# Patient Record
Sex: MALE | Race: WHITE | NOT HISPANIC OR LATINO | Employment: FULL TIME | URBAN - METROPOLITAN AREA
[De-identification: names, ages, dates, MRNs, and addresses within clinical notes are randomized per-mention and may not be internally consistent; named-entity substitution may affect disease eponyms.]

---

## 2018-09-12 ENCOUNTER — HOSPITAL ENCOUNTER (EMERGENCY)
Facility: HOSPITAL | Age: 33
Discharge: HOME | End: 2018-09-12
Attending: EMERGENCY MEDICINE

## 2018-09-12 VITALS
TEMPERATURE: 98 F | RESPIRATION RATE: 16 BRPM | DIASTOLIC BLOOD PRESSURE: 61 MMHG | HEART RATE: 73 BPM | OXYGEN SATURATION: 97 % | WEIGHT: 180 LBS | SYSTOLIC BLOOD PRESSURE: 128 MMHG

## 2018-09-12 DIAGNOSIS — T16.2XXA FOREIGN BODY OF LEFT EAR, INITIAL ENCOUNTER: ICD-10-CM

## 2018-09-12 DIAGNOSIS — J01.90 ACUTE SINUSITIS, RECURRENCE NOT SPECIFIED, UNSPECIFIED LOCATION: Primary | ICD-10-CM

## 2018-09-12 PROCEDURE — 99283 EMERGENCY DEPT VISIT LOW MDM: CPT | Mod: 25

## 2018-09-12 PROCEDURE — 96372 THER/PROPH/DIAG INJ SC/IM: CPT

## 2018-09-12 PROCEDURE — 63600175 PHARM REV CODE 636 W HCPCS: Performed by: NURSE PRACTITIONER

## 2018-09-12 RX ORDER — CETIRIZINE HYDROCHLORIDE 10 MG/1
10 TABLET ORAL DAILY PRN
Qty: 20 TABLET | Refills: 0 | Status: SHIPPED | OUTPATIENT
Start: 2018-09-12 | End: 2020-04-01 | Stop reason: SDUPTHER

## 2018-09-12 RX ORDER — DEXAMETHASONE SODIUM PHOSPHATE 4 MG/ML
12 INJECTION, SOLUTION INTRA-ARTICULAR; INTRALESIONAL; INTRAMUSCULAR; INTRAVENOUS; SOFT TISSUE
Status: COMPLETED | OUTPATIENT
Start: 2018-09-12 | End: 2018-09-12

## 2018-09-12 RX ORDER — ONDANSETRON 4 MG/1
4 TABLET, FILM COATED ORAL EVERY 8 HOURS PRN
Qty: 14 TABLET | Refills: 0 | Status: SHIPPED | OUTPATIENT
Start: 2018-09-12 | End: 2019-01-31

## 2018-09-12 RX ORDER — AMOXICILLIN 875 MG/1
875 TABLET, FILM COATED ORAL 2 TIMES DAILY
Qty: 20 TABLET | Refills: 0 | Status: SHIPPED | OUTPATIENT
Start: 2018-09-12 | End: 2018-09-22

## 2018-09-12 RX ADMIN — DEXAMETHASONE SODIUM PHOSPHATE 12 MG: 4 INJECTION, SOLUTION INTRAMUSCULAR; INTRAVENOUS at 06:09

## 2018-09-12 NOTE — ED PROVIDER NOTES
"Encounter Date: 9/12/2018    SCRIBE #1 NOTE: I, Mercedes Sahu, am scribing for, and in the presence of,  JOSE ANTONIO Leon. I have scribed the following portions of the note - Other sections scribed: HPI, ROS, PE.       History     Chief Complaint   Patient presents with    URI     Pt states," Congestion for last week, last night nausea and vomitng. today nausea and congestion."     A 33 year old male presents to the ED complaining of sinus congestion for one week. He notes postnasal drip, cough, and intermittent headache, but denies fever. He notes he has had these symptoms before but reports they always return. He reports nausea and vomiting x 2 today.  Denies a sore throat. He denies taking OTC meds.         The history is provided by the patient.   URI   The primary symptoms include headaches, cough, nausea and vomiting. Primary symptoms do not include fever or sore throat. Illness onset: one week. This is a recurrent problem.   The headache began more than 2 days ago. The headache is present intermittently. The pain from the headache is at a severity of 0/10. Location/region(s) of the headache: frontal. The headache is associated with activity.   The cough began 6 to 7 days ago. The cough is non-productive.   Nausea began today.   The vomiting began today.   Symptoms associated with the illness include sinus pressure and congestion.     Review of patient's allergies indicates:  No Known Allergies  History reviewed. No pertinent past medical history.  History reviewed. No pertinent surgical history.  History reviewed. No pertinent family history.  Social History     Tobacco Use    Smoking status: Current Every Day Smoker     Types: Cigarettes    Smokeless tobacco: Never Used   Substance Use Topics    Alcohol use: No     Frequency: Never    Drug use: Not on file     Review of Systems   Constitutional: Negative.  Negative for fever.   HENT: Positive for congestion, postnasal drip, sinus pressure and sinus " pain. Negative for sore throat.    Eyes: Negative.    Respiratory: Positive for cough.    Cardiovascular: Negative.    Gastrointestinal: Positive for nausea and vomiting.   Endocrine: Negative.    Genitourinary: Negative.    Musculoskeletal: Negative.    Skin: Negative.    Allergic/Immunologic: Negative.    Neurological: Positive for headaches.   Hematological: Negative.    Psychiatric/Behavioral: Negative.    All other systems reviewed and are negative.      Physical Exam     Initial Vitals [09/12/18 1819]   BP Pulse Resp Temp SpO2   128/61 73 16 98 °F (36.7 °C) 97 %      MAP       --         Physical Exam    Nursing note and vitals reviewed.  Constitutional: He appears well-developed and well-nourished.   HENT:   Head: Normocephalic and atraumatic.   Right Ear: Tympanic membrane normal.   Left Ear: Tympanic membrane normal. A foreign body (dead bug) is present.   Nose: Mucosal edema (congestion) present. Right sinus exhibits frontal sinus tenderness. Left sinus exhibits frontal sinus tenderness.   Mouth/Throat: Uvula is midline, oropharynx is clear and moist and mucous membranes are normal.   Eyes: Conjunctivae are normal.   Neck: Normal range of motion. Neck supple.   Cardiovascular: Normal rate, regular rhythm, normal heart sounds and intact distal pulses. Exam reveals no gallop and no friction rub.    No murmur heard.  Pulmonary/Chest: Breath sounds normal. No respiratory distress. He has no wheezes. He has no rhonchi. He has no rales.   Abdominal: Soft. There is no tenderness.   Musculoskeletal: Normal range of motion.   Neurological: He is alert and oriented to person, place, and time.   Skin: Skin is warm and dry.   Psychiatric: He has a normal mood and affect. His behavior is normal.         ED Course   Foreign Body  Date/Time: 9/12/2018 6:30 PM  Performed by: JOSE ANTONIO Garcia  Authorized by: Sandi Hanley MD   Body area: ear  Location details: left ear  Patient sedated: no  Patient restrained:  no  Patient cooperative: yes  Removal mechanism: ear scoop and curette  1 objects recovered.  Objects recovered: small dead insect   Post-procedure assessment: foreign body removed  Patient tolerance: Patient tolerated the procedure well with no immediate complications      Labs Reviewed - No data to display       Imaging Results    None          Medical Decision Making:   Initial Assessment:   A 33-year-old male who presents to the ED with complaints of nasal congestion for 1 week.  He reports a intermittent headache. He denies fever or chills. Patient reports some vomiting x 2 today.   Differential Diagnosis:   URI  Acute sinusitis  Allergic rhinitis  ED Management:  Physical exam.  Decadron 12 mg IM.  Foreign body removed from left ear.  Discharge home with Amoxicillin, Zyrtec, Zofran.  Follow-up with PCP in 1-2 days.  Return ED for worsening of symptoms.              Scribe Attestation:   Scribe #1: I performed the above scribed service and the documentation accurately describes the services I performed. I attest to the accuracy of the note.               Clinical Impression:     1. Acute sinusitis, recurrence not specified, unspecified location    2. Foreign body of left ear, initial encounter                                   JOSE ANTONIO Garcia  09/12/18 1841       JOSE ANTONIO Garcia  09/12/18 2120

## 2018-10-17 ENCOUNTER — HOSPITAL ENCOUNTER (EMERGENCY)
Facility: HOSPITAL | Age: 33
Discharge: HOME | End: 2018-10-17
Attending: EMERGENCY MEDICINE
Payer: COMMERCIAL

## 2018-10-17 VITALS
BODY MASS INDEX: 25.77 KG/M2 | HEART RATE: 72 BPM | WEIGHT: 180 LBS | HEIGHT: 70 IN | OXYGEN SATURATION: 100 % | TEMPERATURE: 98 F | SYSTOLIC BLOOD PRESSURE: 122 MMHG | DIASTOLIC BLOOD PRESSURE: 74 MMHG | RESPIRATION RATE: 15 BRPM

## 2018-10-17 DIAGNOSIS — M77.8 TENDINITIS OF HAND: Primary | ICD-10-CM

## 2018-10-17 PROCEDURE — 29125 APPL SHORT ARM SPLINT STATIC: CPT

## 2018-10-17 PROCEDURE — 25000003 PHARM REV CODE 250: Performed by: EMERGENCY MEDICINE

## 2018-10-17 PROCEDURE — 99283 EMERGENCY DEPT VISIT LOW MDM: CPT | Mod: 25

## 2018-10-17 RX ORDER — KETOROLAC TROMETHAMINE 10 MG/1
10 TABLET, FILM COATED ORAL
Status: COMPLETED | OUTPATIENT
Start: 2018-10-17 | End: 2018-10-17

## 2018-10-17 RX ORDER — IBUPROFEN 800 MG/1
800 TABLET ORAL EVERY 6 HOURS PRN
Qty: 20 TABLET | Refills: 0 | Status: SHIPPED | OUTPATIENT
Start: 2018-10-17 | End: 2018-11-01

## 2018-10-17 RX ADMIN — KETOROLAC TROMETHAMINE 10 MG: 10 TABLET, FILM COATED ORAL at 04:10

## 2018-10-17 NOTE — ED PROVIDER NOTES
Encounter Date: 10/17/2018       History     Chief Complaint   Patient presents with    Hand Pain     pt reports he has been having left hand pain x 2 months but now pain is radiating to left wrist. pt reports taking ibuprofen for pain but has had no relief. pt denies any recent injury or trauma     33-year-old complains of pain to his left hand.  Patient said he has had pain to the palm of his hand intermittently for the last 2-4 months.  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  He denies direct trauma.  Over the last few days patient has notice pain to the dorsum of his left hand that radiates through to the top of his hand and to his left 3rd metacarpal area.  He has not noted redness, warmth or edema. No numbness.  Patient occasionally takes ibuprofen.  He brought a Velcro wrist splint from the store that is not helping.  However he just started using this yesterday.      The history is provided by the patient.     Review of patient's allergies indicates:  No Known Allergies  History reviewed. No pertinent past medical history.  History reviewed. No pertinent surgical history.  History reviewed. No pertinent family history.  Social History     Tobacco Use    Smoking status: Current Every Day Smoker     Types: Cigarettes    Smokeless tobacco: Never Used   Substance Use Topics    Alcohol use: No     Frequency: Never    Drug use: Not on file     Review of Systems   Musculoskeletal: Negative for joint swelling.        Left hand pain     Skin: Negative for color change.   Neurological: Negative for weakness and numbness.       Physical Exam     Initial Vitals [10/17/18 1559]   BP Pulse Resp Temp SpO2   130/81 74 18 98.1 °F (36.7 °C) 99 %      MAP       --         Physical Exam    Nursing note and vitals reviewed.  Constitutional: No distress.   Musculoskeletal:        Hands:  Neurological: He is alert and oriented to person, place, and time. He has normal strength. No sensory deficit.   Skin:  Skin is warm. No erythema.   Psychiatric: He has a normal mood and affect.         ED Course   Procedures  Labs Reviewed - No data to display       Imaging Results    None          Medical Decision Making:   Initial Assessment:   33-year-old complains of pain to the palm of his hand as well as to the dorsum of the hand along the 3rd metacarpal.  Patient's exam is benign.    ED Management:  Patient was placed in a Velcro wrist splint.  He has a wrist splint with him that is not giving much support.  I feel it is tendinitis.  I encouraged the patient to follow up with his workman's comp doctor from his job.  He will be prescribed ibuprofen.                      Clinical Impression:   The encounter diagnosis was Tendinitis of hand.                             Sandi Hanley MD  10/17/18 2983

## 2018-11-01 ENCOUNTER — OFFICE VISIT (OUTPATIENT)
Dept: FAMILY MEDICINE | Facility: CLINIC | Age: 33
End: 2018-11-01
Payer: COMMERCIAL

## 2018-11-01 VITALS
BODY MASS INDEX: 24.52 KG/M2 | TEMPERATURE: 98 F | OXYGEN SATURATION: 98 % | HEART RATE: 62 BPM | SYSTOLIC BLOOD PRESSURE: 104 MMHG | RESPIRATION RATE: 12 BRPM | WEIGHT: 171.31 LBS | DIASTOLIC BLOOD PRESSURE: 72 MMHG | HEIGHT: 70 IN

## 2018-11-01 DIAGNOSIS — G56.02 LEFT CARPAL TUNNEL SYNDROME: Primary | ICD-10-CM

## 2018-11-01 PROCEDURE — 3008F BODY MASS INDEX DOCD: CPT | Mod: CPTII,S$GLB,, | Performed by: FAMILY MEDICINE

## 2018-11-01 PROCEDURE — 99214 OFFICE O/P EST MOD 30 MIN: CPT | Mod: S$GLB,,, | Performed by: FAMILY MEDICINE

## 2018-11-01 PROCEDURE — 99999 PR PBB SHADOW E&M-EST. PATIENT-LVL III: CPT | Mod: PBBFAC,,, | Performed by: FAMILY MEDICINE

## 2018-11-01 RX ORDER — PREDNISONE 20 MG/1
40 TABLET ORAL DAILY
Qty: 10 TABLET | Refills: 0 | Status: SHIPPED | OUTPATIENT
Start: 2018-11-01 | End: 2018-11-06

## 2018-11-01 NOTE — LETTER
November 1, 2018      [facility]  [address]  [place] [address]  Phone: [phone]       Patient: Tyrell Bradford   YOB: 1985  Date of Visit: 11/01/2018    To Whom It May Concern:    Triston Bradford  was at [facility] on 11/01/2018. He may return to work/school on 11/1/18 with no restrictions. Patient is excused from work for 10/31/18. If you have any questions or concerns, or if I can be of further assistance, please do not hesitate to contact me.    Sincerely,    Elizabeth Huang MA

## 2018-12-28 NOTE — PROGRESS NOTES
Routine Office Visit    Patient Name: Tyrell Bradford    : 1985  MRN: [id number]    Subjective:  Tyrell is a 33 y.o. male who presents today for:    1. Pain in left hand  Patient presenting today with pain in left hand that occurs when working.  There is numbness and tingling on occasion.  No weakness.  The pain does occur on occasion in the morning when he wakes up.  No pain in the right hand.      Past Medical History  Past Medical History:   Diagnosis Date    Compression fracture of body of thoracic vertebra     on L2       Past Surgical History  History reviewed. No pertinent surgical history.    Family History  Family History   Problem Relation Age of Onset    Diabetes Mother     Cancer Father        Social History  Social History     Socioeconomic History    Marital status: Single     Spouse name: Not on file    Number of children: Not on file    Years of education: Not on file    Highest education level: Not on file   Social Needs    Financial resource strain: Not on file    Food insecurity - worry: Not on file    Food insecurity - inability: Not on file    Transportation needs - medical: Not on file    Transportation needs - non-medical: Not on file   Occupational History    Not on file   Tobacco Use    Smoking status: Current Every Day Smoker     Types: Cigarettes    Smokeless tobacco: Never Used   Substance and Sexual Activity    Alcohol use: No     Frequency: Never    Drug use: No    Sexual activity: Not Currently   Other Topics Concern    Not on file   Social History Narrative    Not on file       Current Medications  Current Outpatient Medications on File Prior to Visit   Medication Sig Dispense Refill    cetirizine (ZYRTEC) 10 MG tablet Take 1 tablet (10 mg total) by mouth daily as needed for Allergies. 20 tablet 0    ondansetron (ZOFRAN) 4 MG tablet Take 1 tablet (4 mg total) by mouth every 8 (eight) hours as needed for Nausea. 14 tablet 0     No current  "facility-administered medications on file prior to visit.        Allergies   Review of patient's allergies indicates:  No Known Allergies    Review of Systems (Pertinent positives)  Review of Systems   Constitutional: Negative.    HENT: Negative.    Respiratory: Negative.    Cardiovascular: Negative.    Musculoskeletal: Positive for myalgias.   Skin: Negative.    Neurological: Positive for tingling.         /72 (BP Location: Left arm, Patient Position: Sitting, BP Method: Medium (Automatic))   Pulse 62   Temp 98.2 °F (36.8 °C) (Oral)   Resp 12   Ht 5' 10" (1.778 m)   Wt 77.7 kg (171 lb 4.8 oz)   SpO2 98%   BMI 24.58 kg/m²     GENERAL APPEARANCE: in no apparent distress and well developed and well nourished  HEENT: PERRL, EOMI, Sclera clear, anicteric, Oropharynx clear, no lesions, Neck supple with midline trachea  NECK: normal, supple, no adenopathy, thyroid normal in size  RESPIRATORY: appears well, vitals normal, no respiratory distress, acyanotic, normal RR, chest clear, no wheezing, crepitations, rhonchi, normal symmetric air entry  HEART: regular rate and rhythm, S1, S2 normal, no murmur, click, rub or gallop.    ABDOMEN: abdomen is soft without tenderness, no masses, no hernias, no organomegaly, no rebound, no guarding. Suprapubic tenderness absent. No CVA tenderness.  NEUROLOGIC: normal without focal findings, CN II-XII are intact.  Strength 5/5 in all extremities; +Tinels in left hand  SKIN: no rashes, no wounds, no other lesions  PSYCH: Alert, oriented x 3, thought content appropriate, speech normal, pleasant and cooperative, good eye contact, well groomed    Assessment/Plan:  Tyrell Bradford is a 33 y.o. male who presents today for :    Tyrell was seen today for hand pain.    Diagnoses and all orders for this visit:    Left carpal tunnel syndrome  -     predniSONE (DELTASONE) 20 MG tablet; Take 2 tablets (40 mg total) by mouth once daily. for 5 days      1.  Deltasone for inflammation  2.  If " no better will refer to ortho  3.  Follow up if no improvement      Efren Dukes MD

## 2019-01-29 ENCOUNTER — HOSPITAL ENCOUNTER (EMERGENCY)
Facility: HOSPITAL | Age: 34
Discharge: HOME | End: 2019-01-29
Attending: EMERGENCY MEDICINE
Payer: COMMERCIAL

## 2019-01-29 VITALS
WEIGHT: 170 LBS | OXYGEN SATURATION: 100 % | HEART RATE: 70 BPM | DIASTOLIC BLOOD PRESSURE: 64 MMHG | TEMPERATURE: 99 F | SYSTOLIC BLOOD PRESSURE: 123 MMHG | HEIGHT: 70 IN | BODY MASS INDEX: 24.34 KG/M2 | RESPIRATION RATE: 18 BRPM

## 2019-01-29 DIAGNOSIS — T15.91XA FOREIGN BODY, EYE, RIGHT, INITIAL ENCOUNTER: Primary | ICD-10-CM

## 2019-01-29 PROCEDURE — 99283 EMERGENCY DEPT VISIT LOW MDM: CPT | Mod: ER

## 2019-01-29 PROCEDURE — 63600175 PHARM REV CODE 636 W HCPCS: Mod: ER | Performed by: EMERGENCY MEDICINE

## 2019-01-29 PROCEDURE — 90471 IMMUNIZATION ADMIN: CPT | Mod: ER | Performed by: EMERGENCY MEDICINE

## 2019-01-29 PROCEDURE — 90715 TDAP VACCINE 7 YRS/> IM: CPT | Mod: ER | Performed by: EMERGENCY MEDICINE

## 2019-01-29 PROCEDURE — 65205 REMOVE FOREIGN BODY FROM EYE: CPT

## 2019-01-29 PROCEDURE — 25000003 PHARM REV CODE 250: Mod: ER | Performed by: EMERGENCY MEDICINE

## 2019-01-29 RX ORDER — TRAMADOL HYDROCHLORIDE 50 MG/1
50 TABLET ORAL EVERY 6 HOURS PRN
Qty: 12 TABLET | Refills: 0 | Status: SHIPPED | OUTPATIENT
Start: 2019-01-29 | End: 2019-02-08

## 2019-01-29 RX ORDER — IBUPROFEN 800 MG/1
800 TABLET ORAL EVERY 6 HOURS PRN
Qty: 20 TABLET | Refills: 0 | OUTPATIENT
Start: 2019-01-29 | End: 2019-05-22

## 2019-01-29 RX ORDER — KETOROLAC TROMETHAMINE 10 MG/1
10 TABLET, FILM COATED ORAL
Status: COMPLETED | OUTPATIENT
Start: 2019-01-29 | End: 2019-01-29

## 2019-01-29 RX ORDER — SULFACETAMIDE SODIUM 100 MG/ML
2 SOLUTION/ DROPS OPHTHALMIC ONCE
Status: COMPLETED | OUTPATIENT
Start: 2019-01-29 | End: 2019-01-29

## 2019-01-29 RX ORDER — PROPARACAINE HYDROCHLORIDE 5 MG/ML
1 SOLUTION/ DROPS OPHTHALMIC
Status: COMPLETED | OUTPATIENT
Start: 2019-01-29 | End: 2019-01-29

## 2019-01-29 RX ORDER — CYCLOPENTOLATE HYDROCHLORIDE 10 MG/ML
2 SOLUTION/ DROPS OPHTHALMIC
Status: COMPLETED | OUTPATIENT
Start: 2019-01-29 | End: 2019-01-29

## 2019-01-29 RX ADMIN — KETOROLAC TROMETHAMINE 10 MG: 10 TABLET, FILM COATED ORAL at 05:01

## 2019-01-29 RX ADMIN — CYCLOPENTOLATE HYDROCHLORIDE 2 DROP: 10 SOLUTION/ DROPS OPHTHALMIC at 05:01

## 2019-01-29 RX ADMIN — PROPARACAINE HYDROCHLORIDE 1 DROP: 5 SOLUTION/ DROPS OPHTHALMIC at 05:01

## 2019-01-29 RX ADMIN — CLOSTRIDIUM TETANI TOXOID ANTIGEN (FORMALDEHYDE INACTIVATED), CORYNEBACTERIUM DIPHTHERIAE TOXOID ANTIGEN (FORMALDEHYDE INACTIVATED), BORDETELLA PERTUSSIS TOXOID ANTIGEN (GLUTARALDEHYDE INACTIVATED), BORDETELLA PERTUSSIS FILAMENTOUS HEMAGGLUTININ ANTIGEN (FORMALDEHYDE INACTIVATED), BORDETELLA PERTUSSIS PERTACTIN ANTIGEN, AND BORDETELLA PERTUSSIS FIMBRIAE 2/3 ANTIGEN 0.5 ML: 5; 2; 2.5; 5; 3; 5 INJECTION, SUSPENSION INTRAMUSCULAR at 05:01

## 2019-01-29 RX ADMIN — FLUORESCEIN SODIUM 1 EACH: 1 STRIP OPHTHALMIC at 05:01

## 2019-01-29 RX ADMIN — SULFACETAMIDE SODIUM 2 DROP: 100 SOLUTION/ DROPS OPHTHALMIC at 05:01

## 2019-01-29 NOTE — DISCHARGE INSTRUCTIONS
Follow up with the eye doctor tomorrow.  Use one drop of the Cyclopentolate daily and wear sunglasses. Use the antibiotic eye drops to right eye 4 times per day.

## 2019-01-29 NOTE — ED PROVIDER NOTES
"Encounter Date: 1/29/2019    SCRIBE #1 NOTE: I, Romero Galaviz, am scribing for, and in the presence of,  Dr. Hanley. I have scribed the following portions of the note - Other sections scribed: HPI, ROS, PE.       History     Chief Complaint   Patient presents with    Right eye irritation     Onset x 2 days; Pt say "I think something in my eye."  Pt denies any injury to right eye. Pt reported using visine for eyes.     This is a 33 y.o. male who presents to the ED with a complaint of right eye redness and clear fluid that began two days ago. Pt states that he initially thought his symptoms were due to allergies. He states that he felt like there was a scratch to his right eye yesterday. He has applied visine to this eye without relief. Pt denies rubbing his eye and does not remember anything flying into his eye. He reports blurred vision to his right eye. He denies using glasses or contacts.  Pt notes that he saw something in the colored part of his eye. 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      The history is provided by the patient.     Review of patient's allergies indicates:  No Known Allergies  Past Medical History:   Diagnosis Date    Compression fracture of body of thoracic vertebra     on L2     History reviewed. No pertinent surgical history.  Family History   Problem Relation Age of Onset    Diabetes Mother     Cancer Father      Social History     Tobacco Use    Smoking status: Current Every Day Smoker     Types: Cigarettes    Smokeless tobacco: Never Used   Substance Use Topics    Alcohol use: No     Frequency: Never    Drug use: No     Review of Systems   Eyes: Positive for photophobia, pain, discharge, redness and visual disturbance.   Neurological: Negative for headaches.       Physical Exam     Initial Vitals [01/29/19 1713]   BP Pulse Resp Temp SpO2   123/64 70 18 98.6 °F (37 °C) 100 %      MAP       --         Physical Exam    Nursing note and vitals reviewed.  Constitutional: " He appears well-developed and well-nourished.   HENT:   Head: Normocephalic and atraumatic.   Eyes: Conjunctivae and EOM are normal. Pupils are equal, round, and reactive to light. Lids are everted and swept, no foreign bodies found.   Slit lamp exam:       The right eye shows fluorescein uptake.       Neck: Normal range of motion. Neck supple.   Pulmonary/Chest: No respiratory distress.   Musculoskeletal: Normal range of motion.   Neurological: He is alert and oriented to person, place, and time.   Skin: Skin is warm and dry.   Psychiatric: He has a normal mood and affect. His behavior is normal.         ED Course   Foreign Body  Date/Time: 1/29/2019 5:43 PM  Performed by: Sandi Hanley MD  Authorized by: Sandi Hanley MD   Body area: eye    Anesthesia:  Local Anesthetic: proparacaine drops  Patient sedated: no  Patient restrained: no  Localization method: visualized  Removal mechanism: ophthalmic montez  Eye examined with fluorescein.  Fluorescein uptake.  Corneal abrasion size: small  Corneal abrasion location: inferior and medial  No residual rust ring present.  Dressing: antibiotic ointment  Depth: embedded  Complexity: simple  1 objects recovered.  Objects recovered: unclear- tiny   Post-procedure assessment: foreign body removed  Patient tolerance: Patient tolerated the procedure well with no immediate complications      Labs Reviewed - No data to display       Imaging Results    None          Medical Decision Making:   Initial Assessment:   This is a 33 y.o. male who presents to the ED with a complaint of right eye pain with redness and clear fluid.     The pt's physical examination is significant for a foreign body to the right eye.   ED Management:  I will order a visual acuity screening.   Foreign body was removed with a montez.  Patient will be referred to Ophthalmology to be seen tomorrow.  Patient tolerated the procedure well    Patient will be treated with cyclopentolate, sulfacetamide,  proparacaine, and fluorescein.             Scribe Attestation:   Scribe #1: I performed the above scribed service and the documentation accurately describes the services I performed. I attest to the accuracy of the note.       I, Dr. Sandi Hanley, personally performed the services described in this documentation. All medical record entries made by the scribe were at my direction and in my presence.  I have reviewed the chart and agree that the record reflects my personal performance and is accurate and complete. Sandi Hanley MD.  5:45 PM 01/29/2019             Clinical Impression:     1. Foreign body, eye, right, initial encounter                                   Sandi Hanley MD  01/29/19 8030       Sandi Hanley MD  01/29/19 1528

## 2019-01-29 NOTE — LETTER
[address] [facility]  [place] [address]  Phone: [phone]  Fax: [phone]   Dr. Hernandez,   Hi! I was hoping that you could see this nice gentleman in your office tomorrow (Wednesday). I removed a foreign body from his right eye with a montez in the ED on Tuesday and I would like him to be re-evaluated. Thanks, Sandi

## 2019-01-30 ENCOUNTER — TELEPHONE (OUTPATIENT)
Dept: OPHTHALMOLOGY | Facility: CLINIC | Age: 34
End: 2019-01-30

## 2019-01-31 ENCOUNTER — OFFICE VISIT (OUTPATIENT)
Dept: OPTOMETRY | Facility: CLINIC | Age: 34
End: 2019-01-31
Payer: COMMERCIAL

## 2019-01-31 ENCOUNTER — TELEPHONE (OUTPATIENT)
Dept: OPHTHALMOLOGY | Facility: CLINIC | Age: 34
End: 2019-01-31

## 2019-01-31 DIAGNOSIS — S05.01XD CORNEAL ABRASION, RIGHT, SUBSEQUENT ENCOUNTER: Primary | ICD-10-CM

## 2019-01-31 PROCEDURE — 92004 COMPRE OPH EXAM NEW PT 1/>: CPT | Mod: S$GLB,,, | Performed by: OPTOMETRIST

## 2019-01-31 PROCEDURE — 92004 PR EYE EXAM, NEW PATIENT,COMPREHESV: ICD-10-PCS | Mod: S$GLB,,, | Performed by: OPTOMETRIST

## 2019-01-31 PROCEDURE — 99999 PR PBB SHADOW E&M-EST. PATIENT-LVL II: CPT | Mod: PBBFAC,,, | Performed by: OPTOMETRIST

## 2019-01-31 PROCEDURE — 99999 PR PBB SHADOW E&M-EST. PATIENT-LVL II: ICD-10-PCS | Mod: PBBFAC,,, | Performed by: OPTOMETRIST

## 2019-01-31 NOTE — PROGRESS NOTES
"Subjective:       Patient ID: Tyrell Bradford is a 33 y.o. male      Chief Complaint   Patient presents with    Eye Problem     History of Present Illness  Dls: ?     32 y/o male presents today with c/o x 4 days ago fbs od pt was seen in ER 2 days ago and ha fb removed from od no pain some itching od blurry vision od. Pt was given 2 rx gtts from ER one had a red top.     Per ED note "Patient will be treated with cyclopentolate, sulfacetamide, proparacaine, and fluorescein."        Assessment/Plan:     1. Corneal abrasion, right, subsequent encounter  Residual abrasion from FB OD. Pt to d/c  drop. Continue abx drops til Sunday and then stop. AT PRN. RTC PRN.     Follow-up if symptoms worsen or fail to improve.       "

## 2019-05-22 ENCOUNTER — HOSPITAL ENCOUNTER (EMERGENCY)
Facility: HOSPITAL | Age: 34
Discharge: HOME | End: 2019-05-22
Attending: EMERGENCY MEDICINE
Payer: COMMERCIAL

## 2019-05-22 VITALS
TEMPERATURE: 98 F | OXYGEN SATURATION: 97 % | HEART RATE: 86 BPM | RESPIRATION RATE: 16 BRPM | DIASTOLIC BLOOD PRESSURE: 75 MMHG | SYSTOLIC BLOOD PRESSURE: 124 MMHG | WEIGHT: 180 LBS | BODY MASS INDEX: 25.83 KG/M2

## 2019-05-22 DIAGNOSIS — S05.01XA ABRASION OF RIGHT CORNEA, INITIAL ENCOUNTER: Primary | ICD-10-CM

## 2019-05-22 PROCEDURE — 25000003 PHARM REV CODE 250: Mod: ER | Performed by: EMERGENCY MEDICINE

## 2019-05-22 PROCEDURE — 99284 EMERGENCY DEPT VISIT MOD MDM: CPT | Mod: ER

## 2019-05-22 RX ORDER — ERYTHROMYCIN 5 MG/G
OINTMENT OPHTHALMIC
Qty: 1 TUBE | Refills: 0 | Status: SHIPPED | OUTPATIENT
Start: 2019-05-22 | End: 2020-04-01

## 2019-05-22 RX ORDER — PROPARACAINE HYDROCHLORIDE 5 MG/ML
2 SOLUTION/ DROPS OPHTHALMIC
Status: COMPLETED | OUTPATIENT
Start: 2019-05-22 | End: 2019-05-22

## 2019-05-22 RX ORDER — HYDROCODONE BITARTRATE AND ACETAMINOPHEN 5; 325 MG/1; MG/1
1 TABLET ORAL EVERY 8 HOURS PRN
Qty: 6 TABLET | Refills: 0 | Status: SHIPPED | OUTPATIENT
Start: 2019-05-22

## 2019-05-22 RX ORDER — IBUPROFEN 600 MG/1
600 TABLET ORAL EVERY 6 HOURS PRN
Qty: 20 TABLET | Refills: 0 | Status: SHIPPED | OUTPATIENT
Start: 2019-05-22

## 2019-05-22 RX ORDER — ACETAMINOPHEN 500 MG
500 TABLET ORAL EVERY 6 HOURS PRN
Qty: 30 TABLET | Refills: 0 | Status: SHIPPED | OUTPATIENT
Start: 2019-05-22

## 2019-05-22 RX ORDER — ERYTHROMYCIN 5 MG/G
OINTMENT OPHTHALMIC
Status: COMPLETED | OUTPATIENT
Start: 2019-05-22 | End: 2019-05-22

## 2019-05-22 RX ADMIN — ERYTHROMYCIN 1 INCH: 5 OINTMENT OPHTHALMIC at 06:05

## 2019-05-22 RX ADMIN — PROPARACAINE HYDROCHLORIDE 2 DROP: 5 SOLUTION/ DROPS OPHTHALMIC at 06:05

## 2019-05-22 RX ADMIN — FLUORESCEIN SODIUM 1 EACH: 1 STRIP OPHTHALMIC at 06:05

## 2019-05-22 NOTE — ED PROVIDER NOTES
"Encounter Date: 5/22/2019    SCRIBE #1 NOTE: I, Sanjuana Huff, am scribing for, and in the presence of,  Dr. Horne . I have scribed the following portions of the note - Other sections scribed: HPI, ROS, PE.       History     Chief Complaint   Patient presents with    Eye Problem     Pt states," I got something in my right eye one week ago. 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 He thinks he possibly got some dirt in his eyes. The eye irritation got better and then returned today. Rates pain a 3/10, at its worse a 5/10. Has been using drops from prior visit without relief. Tetanus is UTD. Vision is normal. Denies blurry vision. Does not wear contacts or glasses.    The history is provided by the patient. No  was used.     Review of patient's allergies indicates:  No Known Allergies  Past Medical History:   Diagnosis Date    Compression fracture of body of thoracic vertebra     on L2     History reviewed. No pertinent surgical history.  Family History   Problem Relation Age of Onset    Diabetes Mother     Cancer Father     No Known Problems Sister     No Known Problems Brother     No Known Problems Maternal Aunt     No Known Problems Maternal Uncle     No Known Problems Paternal Aunt     No Known Problems Paternal Uncle     No Known Problems Maternal Grandmother     No Known Problems Maternal Grandfather     No Known Problems Paternal Grandmother     No Known Problems Paternal Grandfather     Amblyopia Neg Hx     Blindness Neg Hx     Cataracts Neg Hx     Glaucoma Neg Hx     Hypertension Neg Hx     Macular degeneration Neg Hx     Retinal detachment Neg Hx     Strabismus Neg Hx     Stroke Neg Hx     Thyroid disease Neg Hx      Social History     Tobacco Use    Smoking status: Current Every Day Smoker     Types: Cigarettes    Smokeless tobacco: Never Used   Substance Use Topics    " Alcohol use: No     Frequency: Never    Drug use: No     Review of Systems   Constitutional: Negative for fever.   HENT: Negative for sore throat.    Eyes: Positive for pain (irritation) and redness. Negative for photophobia, discharge and visual disturbance.   Respiratory: Negative for shortness of breath.    Cardiovascular: Negative for chest pain.   Gastrointestinal: Negative for nausea.   Genitourinary: Negative for dysuria.   Musculoskeletal: Negative for back pain.   Skin: Negative for rash.   Neurological: Negative for weakness.   Hematological: Does not bruise/bleed easily.   All other systems reviewed and are negative.      Physical Exam     Initial Vitals [05/22/19 1735]   BP Pulse Resp Temp SpO2   124/75 86 16 98 °F (36.7 °C) 97 %      MAP       --         Physical Exam    Nursing note and vitals reviewed.  Constitutional: He appears well-developed and well-nourished.   HENT:   Head: Normocephalic and atraumatic.   Right Ear: External ear normal.   Left Ear: External ear normal.   Nose: Nose normal.   Mouth/Throat: Oropharynx is clear and moist.   Eyes: EOM and lids are normal. Pupils are equal, round, and reactive to light. Lids are everted and swept, no foreign bodies found.   Slit lamp exam:       The right eye shows corneal abrasion (at 5 o'clock and 10 o'clock).   Irritated right conjunctiva.    Neck: Normal range of motion. Neck supple.   Cardiovascular: Normal rate, regular rhythm, normal heart sounds and intact distal pulses. Exam reveals no gallop and no friction rub.    No murmur heard.  Pulmonary/Chest: Breath sounds normal. No stridor. No respiratory distress. He has no wheezes. He has no rhonchi. He has no rales. He exhibits no tenderness.   Abdominal: Soft. Bowel sounds are normal. He exhibits no distension and no mass. There is no tenderness. There is no rebound and no guarding.   Musculoskeletal: Normal range of motion.        Right ankle: He exhibits swelling. Tenderness.   tenderness  and swelling to right ankle and right calf. point tenderness on lateral ankle.    Neurological: He is alert and oriented to person, place, and time. No cranial nerve deficit or sensory deficit. GCS score is 15. GCS eye subscore is 4. GCS verbal subscore is 5. GCS motor subscore is 6.   Skin: Skin is warm and dry. Capillary refill takes less than 2 seconds. No rash noted.   Psychiatric: He has a normal mood and affect. His behavior is normal.       Patient gave consent to have physical exam performed.      ED Course   Procedures       Medical decision making   Fill and take prescriptions as directed.  Return to the ED if symptoms worsen or do not resolve.   Answered questions and discussed discharge plan.    Patient feels better and is ready for discharge.  Follow up with PCP/specialist in 1 day.                    Scribe Attestation:   Scribe #1: I performed the above scribed service and the documentation accurately describes the services I performed. I attest to the accuracy of the note.     I, Dr. Mine Horne, personally performed the services described in this documentation. This document was produced by a scribe under my direction and in my presence. All medical record entries made by the scribe were at my direction and in my presence.  I have reviewed the chart and agree that the record reflects my personal performance and is accurate and complete. Mine Horne DO.     05/26/2019 11:25 AM          ED Course as of May 22 1825   Wed May 22, 2019   1744 A 33-year-old male who presents to the ED with complaints of right eye pain.  Patient states he got something in his eye 1 weeks ago. The symptoms improved, but returned today.     [TA]      ED Course User Index  [TA] JOSE ANTONIO Garcia     Clinical Impression:     1. Abrasion of right cornea, initial encounter                                   Mine Horne DO  05/26/19 3709

## 2019-10-29 ENCOUNTER — OFFICE VISIT (OUTPATIENT)
Dept: OPTOMETRY | Facility: CLINIC | Age: 34
End: 2019-10-29
Payer: COMMERCIAL

## 2019-10-29 DIAGNOSIS — B30.9 VIRAL CONJUNCTIVITIS: Primary | ICD-10-CM

## 2019-10-29 PROCEDURE — 99999 PR PBB SHADOW E&M-EST. PATIENT-LVL II: CPT | Mod: PBBFAC,,, | Performed by: OPTOMETRIST

## 2019-10-29 PROCEDURE — 99999 PR PBB SHADOW E&M-EST. PATIENT-LVL II: ICD-10-PCS | Mod: PBBFAC,,, | Performed by: OPTOMETRIST

## 2019-10-29 PROCEDURE — 92012 PR EYE EXAM, EST PATIENT,INTERMED: ICD-10-PCS | Mod: S$GLB,,, | Performed by: OPTOMETRIST

## 2019-10-29 PROCEDURE — 92012 INTRM OPH EXAM EST PATIENT: CPT | Mod: S$GLB,,, | Performed by: OPTOMETRIST

## 2019-10-29 RX ORDER — PREDNISOLONE ACETATE 10 MG/ML
SUSPENSION/ DROPS OPHTHALMIC
Qty: 5 ML | Refills: 0 | Status: SHIPPED | OUTPATIENT
Start: 2019-10-29

## 2019-10-29 NOTE — PROGRESS NOTES
Subjective:       Patient ID: Tyrell Bradford is a 34 y.o. male      Chief Complaint   Patient presents with    Eye Problem     History of Present Illness  Dls: 1/31/19 Dr. Curry     35 y/o male presents today with c/o x 1 day woke sharp shooting pains os photophobia os blurry vision os red os tearing os fbs os. Pt did not use any gtts.         Assessment/Plan:     1. Viral conjunctivitis   Allergic vs viral etiology.               Start PredForte q2h OS  x 2 days, then QID OS x 5 days. Shake bottle well before each use. Advised pt to call or RTC if symptoms have not improved in about 5-7 days.              Advised pt that condition may be contagious. Continue washing hands frequently and avoiding touching eyes/face. Recommended cool compresses.              Okay to use artificial tears prn, but wait at least 10 minutes apart from PredForte.     - prednisoLONE acetate (PRED FORTE) 1 % DrpS; Use 1 drop left eye every 2 hours for 2 days, then one drop 4 times a day for 5 days. Remember to shake the medication before use.  Dispense: 5 mL; Refill: 0    Follow up if symptoms worsen or fail to improve.

## 2019-10-29 NOTE — LETTER
October 29, 2019      [facility]  [address]  [place] [address]  Phone: [phone]  Fax: [phone]       Patient: Tyrell Bradford   YOB: 1985  Date of Visit: 10/29/2019    To Whom It May Concern:    Triston Bradford  was at [facility] on 10/29/2019. He may return to work/school on 10/30/2019. If you have any questions or concerns, or if I can be of further assistance, please do not hesitate to contact me.    Sincerely,

## 2020-04-01 ENCOUNTER — OFFICE VISIT (OUTPATIENT)
Dept: INTERNAL MEDICINE | Facility: CLINIC | Age: 35
End: 2020-04-01
Payer: COMMERCIAL

## 2020-04-01 DIAGNOSIS — J01.00 ACUTE NON-RECURRENT MAXILLARY SINUSITIS: Primary | ICD-10-CM

## 2020-04-01 PROCEDURE — 99214 PR OFFICE/OUTPT VISIT, EST, LEVL IV, 30-39 MIN: ICD-10-PCS | Mod: 95,,, | Performed by: NURSE PRACTITIONER

## 2020-04-01 PROCEDURE — 99214 OFFICE O/P EST MOD 30 MIN: CPT | Mod: 95,,, | Performed by: NURSE PRACTITIONER

## 2020-04-01 RX ORDER — CETIRIZINE HYDROCHLORIDE 10 MG/1
10 TABLET ORAL DAILY PRN
Qty: 30 TABLET | Refills: 0 | Status: SHIPPED | OUTPATIENT
Start: 2020-04-01 | End: 2020-04-01

## 2020-04-01 RX ORDER — AZITHROMYCIN 250 MG/1
TABLET, FILM COATED ORAL
Qty: 6 TABLET | Refills: 0 | Status: SHIPPED | OUTPATIENT
Start: 2020-04-01 | End: 2020-04-06

## 2020-04-01 RX ORDER — FLUTICASONE PROPIONATE 50 MCG
1 SPRAY, SUSPENSION (ML) NASAL DAILY
Qty: 16 G | Refills: 0 | Status: SHIPPED | OUTPATIENT
Start: 2020-04-01

## 2020-04-01 RX ORDER — CETIRIZINE HYDROCHLORIDE 10 MG/1
10 TABLET ORAL DAILY PRN
Qty: 30 TABLET | Refills: 0 | Status: SHIPPED | OUTPATIENT
Start: 2020-04-01 | End: 2020-05-01

## 2020-04-01 NOTE — PROGRESS NOTES
Subjective:    The patient location is: [place].  The chief complaint leading to consultation is: cough w/ mucus, HA, sinus pressure  Visit type: Virtual visit with synchronous audio and video  Total time spent with patient: 15  Each patient to whom he or she provides medical services by telemedicine is:  (1) informed of the relationship between the physician and patient and the respective role of any other health care provider with respect to management of the patient; and (2) notified that he or she may decline to receive medical services by telemedicine and may withdraw from such care at any time.         Patient ID: Tyrell Bradford is a 34 y.o. male.    Chief Complaint: No chief complaint on file.    HPI   This is a 33 y/o male patient of Dr. Castro who presents with c/o head pressure, sinus pressure, HA, coughing up yellow mucus over the past 3 days. Denies fever, chills, sob, cp, n/v/d or body aches. Has been taking robitussin.     Review of Systems   Constitutional: Negative for fever.   HENT: Positive for postnasal drip and sinus pressure.    Respiratory: Positive for cough.    Gastrointestinal: Negative for diarrhea, nausea and vomiting.   Musculoskeletal: Negative for myalgias.   Neurological: Positive for headaches. Negative for weakness.           Objective:   There were no vitals filed for this visit.       Physical Exam   Constitutional: He is oriented to person, place, and time. He appears well-developed and well-nourished. No distress.   HENT:   Head: Normocephalic.   Eyes: Conjunctivae and EOM are normal.   Neck: Normal range of motion. Neck supple.   Cardiovascular:   No c/o CP or s/s of distress noted   Pulmonary/Chest: Effort normal. No respiratory distress.   Musculoskeletal: Normal range of motion.   Neurological: He is alert and oriented to person, place, and time.   Skin: No rash noted. He is not diaphoretic. No erythema. No pallor.   Psychiatric: He has a normal mood and affect. His behavior is  normal. Judgment and thought content normal.           Assessment:       1. Acute non-recurrent maxillary sinusitis        Plan:         stay hydrated  Gargle with warm salt water  Take meds as prescribed  Call for any concerns

## 2020-04-01 NOTE — LETTER
April 1, 2020      [facility]  [address]  [place] [address]  Phone: [phone]  Fax: [phone]       Patient: Tyrell Bradford   YOB: 1985  Date of Visit: 04/01/2020    To Whom It May Concern:    Triston Bradford  was at [facility] on 04/01/2020. He may return to work on 4/6/2020 with no restrictions. If you have any questions or concerns, or if I can be of further assistance, please do not hesitate to contact me.    Sincerely,    Kiesha Rockwell NP

## 2020-05-20 ENCOUNTER — OFFICE VISIT (OUTPATIENT)
Dept: FAMILY MEDICINE | Facility: CLINIC | Age: 35
End: 2020-05-20
Payer: COMMERCIAL

## 2020-05-20 DIAGNOSIS — R05.9 COUGH: ICD-10-CM

## 2020-05-20 DIAGNOSIS — J32.9 SINUSITIS, UNSPECIFIED CHRONICITY, UNSPECIFIED LOCATION: Primary | ICD-10-CM

## 2020-05-20 PROCEDURE — 99214 OFFICE O/P EST MOD 30 MIN: CPT | Mod: 95,,, | Performed by: PHYSICIAN ASSISTANT

## 2020-05-20 PROCEDURE — 99214 PR OFFICE/OUTPT VISIT, EST, LEVL IV, 30-39 MIN: ICD-10-PCS | Mod: 95,,, | Performed by: PHYSICIAN ASSISTANT

## 2020-05-20 RX ORDER — AZELASTINE 1 MG/ML
1 SPRAY, METERED NASAL 2 TIMES DAILY
Qty: 30 ML | Refills: 0 | Status: SHIPPED | OUTPATIENT
Start: 2020-05-20 | End: 2021-05-20

## 2020-05-20 RX ORDER — MONTELUKAST SODIUM 10 MG/1
10 TABLET ORAL NIGHTLY
Qty: 30 TABLET | Refills: 0 | Status: SHIPPED | OUTPATIENT
Start: 2020-05-20 | End: 2020-06-19

## 2020-05-20 NOTE — PROGRESS NOTES
Subjective:       Patient ID: Tyrell Bradford is a 34 y.o. male.    Chief Complaint: Cough    The patient location is: [place]  The chief complaint leading to consultation is:  Cough    Visit type: audiovisual    Face to Face time with patient: 10  min  15 minutes of total time spent on the encounter, which includes face to face time and non-face to face time preparing to see the patient (eg, review of tests), Obtaining and/or reviewing separately obtained history, Documenting clinical information in the electronic or other health record, Independently interpreting results (not separately reported) and communicating results to the patient/family/caregiver, or Care coordination (not separately reported).       Each patient to whom he or she provides medical services by telemedicine is:  (1) informed of the relationship between the physician and patient and the respective role of any other health care provider with respect to management of the patient; and (2) notified that he or she may decline to receive medical services by telemedicine and may withdraw from such care at any time.      Cough   This is a new problem. The current episode started more than 1 month ago (2 months). The problem has been gradually worsening. The cough is productive of sputum (white). Associated symptoms include chest pain (With coughing), headaches, nasal congestion and rhinorrhea. Pertinent negatives include no ear congestion, ear pain, fever, hemoptysis, myalgias, sore throat, shortness of breath or wheezing. Treatments tried: zpak, nasal spray, anthistamine, robitussin. The treatment provided mild relief. There is no history of asthma, bronchitis or pneumonia.      Patient has had multiple exposures to COVID virus in his coworkers    Social History     Socioeconomic History    Marital status: Single     Spouse name: Not on file    Number of children: Not on file    Years of education: Not on file    Highest education level: Not on  file   Occupational History    Not on file   Social Needs    Financial resource strain: Not on file    Food insecurity:     Worry: Not on file     Inability: Not on file    Transportation needs:     Medical: Not on file     Non-medical: Not on file   Tobacco Use    Smoking status: Current Every Day Smoker     Types: Cigarettes    Smokeless tobacco: Never Used   Substance and Sexual Activity    Alcohol use: No     Frequency: Never    Drug use: No    Sexual activity: Not Currently   Lifestyle    Physical activity:     Days per week: Not on file     Minutes per session: Not on file    Stress: Not on file   Relationships    Social connections:     Talks on phone: Not on file     Gets together: Not on file     Attends Baptism service: Not on file     Active member of club or organization: Not on file     Attends meetings of clubs or organizations: Not on file     Relationship status: Not on file   Other Topics Concern    Not on file   Social History Narrative    Not on file         Review of Systems   Constitutional: Negative for activity change, fatigue, fever and unexpected weight change.   HENT: Positive for congestion, rhinorrhea and sinus pressure. Negative for ear pain, hearing loss, sore throat and trouble swallowing.    Eyes: Negative for discharge and visual disturbance.   Respiratory: Positive for cough. Negative for hemoptysis, chest tightness, shortness of breath and wheezing.    Cardiovascular: Positive for chest pain (With coughing). Negative for palpitations.   Gastrointestinal: Negative for blood in stool, constipation, diarrhea and vomiting.   Endocrine: Negative for polyuria.   Genitourinary: Negative for difficulty urinating, hematuria and urgency.   Musculoskeletal: Negative for arthralgias, joint swelling, myalgias and neck pain.   Neurological: Positive for headaches. Negative for weakness.   Psychiatric/Behavioral: Negative for confusion and dysphoric mood.       Objective:       Physical Exam   Constitutional: He appears well-developed and well-nourished.   Pulmonary/Chest: Effort normal. No respiratory distress.       Assessment:       1. Sinusitis, unspecified chronicity, unspecified location    2. Cough        Plan:         Tyrell was seen today for cough.    Diagnoses and all orders for this visit:    Sinusitis, unspecified chronicity, unspecified location  -     montelukast (SINGULAIR) 10 mg tablet; Take 1 tablet (10 mg total) by mouth every evening.  -     azelastine (ASTELIN) 137 mcg (0.1 %) nasal spray; 1 spray (137 mcg total) by Nasal route 2 (two) times daily.  -    patient has had no relief with antihistamine and Flonase will do trial with Singulair and as last and nasal spray.    Cough  -     COVID-19 Routine Screening; Future  -     will test patient due to multiple exposures.  Advised he should stay home until his status is known.  Recommended he go to emergency room if shortness of breath occurs

## 2020-05-21 ENCOUNTER — LAB VISIT (OUTPATIENT)
Dept: FAMILY MEDICINE | Facility: CLINIC | Age: 35
End: 2020-05-21
Payer: COMMERCIAL

## 2020-05-21 DIAGNOSIS — R05.9 COUGH: ICD-10-CM

## 2020-05-21 PROCEDURE — U0003 INFECTIOUS AGENT DETECTION BY NUCLEIC ACID (DNA OR RNA); SEVERE ACUTE RESPIRATORY SYNDROME CORONAVIRUS 2 (SARS-COV-2) (CORONAVIRUS DISEASE [COVID-19]), AMPLIFIED PROBE TECHNIQUE, MAKING USE OF HIGH THROUGHPUT TECHNOLOGIES AS DESCRIBED BY CMS-2020-01-R: HCPCS

## 2020-05-22 ENCOUNTER — TELEPHONE (OUTPATIENT)
Dept: FAMILY MEDICINE | Facility: CLINIC | Age: 35
End: 2020-05-22

## 2020-05-22 LAB — SARS-COV-2 RNA RESP QL NAA+PROBE: NOT DETECTED

## 2020-05-22 NOTE — TELEPHONE ENCOUNTER
----- Message from Catrachita Verdin sent at 5/22/2020 10:51 AM CDT -----  Contact: Patient   Type:  Test Results    Who Called: Patient     Name of Test (Lab/Mammo/Etc): Covid test    Date of Test: 5/21/2020    Ordering Provider:  Dr. Dukes     [facility]    Would the patient rather a call back or a response via [facility]? Call back     Best Call Back Number:  [phone]          For Clinical Team:Has the provider reviewed the results

## 2020-05-23 ENCOUNTER — PATIENT MESSAGE (OUTPATIENT)
Dept: FAMILY MEDICINE | Facility: CLINIC | Age: 35
End: 2020-05-23

## 2020-06-10 ENCOUNTER — OFFICE VISIT (OUTPATIENT)
Dept: INTERNAL MEDICINE | Facility: CLINIC | Age: 35
End: 2020-06-10
Payer: COMMERCIAL

## 2020-06-10 DIAGNOSIS — T14.8XXA MUSCLE STRAIN: Primary | ICD-10-CM

## 2020-06-10 PROCEDURE — 99214 OFFICE O/P EST MOD 30 MIN: CPT | Mod: 95,,, | Performed by: NURSE PRACTITIONER

## 2020-06-10 PROCEDURE — 99214 PR OFFICE/OUTPT VISIT, EST, LEVL IV, 30-39 MIN: ICD-10-PCS | Mod: 95,,, | Performed by: NURSE PRACTITIONER

## 2020-06-10 RX ORDER — METHYLPREDNISOLONE 4 MG/1
TABLET ORAL
Qty: 1 PACKAGE | Refills: 0 | Status: SHIPPED | OUTPATIENT
Start: 2020-06-10 | End: 2020-07-01

## 2020-06-10 RX ORDER — METHOCARBAMOL 500 MG/1
TABLET, FILM COATED ORAL
Qty: 10 TABLET | Refills: 0 | Status: SHIPPED | OUTPATIENT
Start: 2020-06-10

## 2020-06-10 NOTE — LETTER
Odette 10, 2020      [facility]  [address].  [place] [address]  Phone: [phone]  Fax: [phone]       Patient: Tyrell Bradford   YOB: 1985  Date of Visit: 06/10/2020    To Whom It May Concern:    Triston Bradford  Was seen by me via telemed at [facility] on 06/10/2020. He may return to work on 61/15/2020 with no restrictions. If you have any questions or concerns, or if I can be of further assistance, please do not hesitate to contact me.    Sincerely,        Kiesha Rockwell, NP

## 2020-06-10 NOTE — PROGRESS NOTES
Subjective:    The patient location is: [place].   The chief complaint leading to consultation is: muscle strain    Visit type: audiovisual    Face to Face time with patient: 15  20 minutes of total time spent on the encounter, which includes face to face time and non-face to face time preparing to see the patient (eg, review of tests), Obtaining and/or reviewing separately obtained history, Documenting clinical information in the electronic or other health record, Independently interpreting results (not separately reported) and communicating results to the patient/family/caregiver, or Care coordination (not separately reported).       Each patient to whom he or she provides medical services by telemedicine is:  (1) informed of the relationship between the physician and patient and the respective role of any other health care provider with respect to management of the patient; and (2) notified that he or she may decline to receive medical services by telemedicine and may withdraw from such care at any time.       Patient ID: Tyrell Bradford is a 34 y.o. male.    Chief Complaint: No chief complaint on file.    HPI   His is a 34-year-old male patient of Dr. Castro who presents today for complaints of left upper back pain, in between his shoulder blades times a week.  Patient is in construction.  Has taken ibuprofen without much relief.  Patient denies radiating pain numbness and tingling to upper extremities, dizziness, visual changes, shortness of breath or chest pain      Review of Systems   Constitutional: Positive for activity change. Negative for fever.   Eyes: Negative for visual disturbance.   Respiratory: Negative for chest tightness and shortness of breath.    Cardiovascular: Negative for chest pain.   Gastrointestinal: Negative for abdominal pain.   Genitourinary: Negative for dysuria and hematuria.   Musculoskeletal: Positive for back pain and myalgias.   Neurological: Positive for headaches. Negative for  weakness and numbness.             Objective:   There were no vitals filed for this visit.       Physical Exam  Constitutional:       General: He is not in acute distress.     Appearance: He is well-developed. He is not ill-appearing.   HENT:      Head: Normocephalic.      Right Ear: External ear normal.      Left Ear: External ear normal.   Eyes:      Conjunctiva/sclera: Conjunctivae normal.   Neck:      Musculoskeletal: Normal range of motion and neck supple.      Thyroid: No thyromegaly.      Vascular: No JVD.      Trachea: No tracheal deviation.   Cardiovascular:      Comments: No c/o CP or s/s of distress noted  Pulmonary:      Effort: Pulmonary effort is normal. No respiratory distress.   Abdominal:      Palpations: Abdomen is soft.   Musculoskeletal: Normal range of motion.   Lymphadenopathy:      Cervical: No cervical adenopathy.   Skin:     General: Skin is warm and dry.      Coloration: Skin is not pale.      Findings: No erythema or rash.   Neurological:      Mental Status: He is alert and oriented to person, place, and time.   Psychiatric:         Behavior: Behavior normal.         Thought Content: Thought content normal.         Judgment: Judgment normal.             Assessment:       1. Muscle strain        Plan:     reviewed meds and history with patient  Instructed light stretches, increase water intake, warm and cool compresses to site.

## 2020-08-05 ENCOUNTER — OFFICE VISIT (OUTPATIENT)
Dept: INTERNAL MEDICINE | Facility: CLINIC | Age: 35
End: 2020-08-05
Payer: COMMERCIAL

## 2020-08-05 DIAGNOSIS — R11.0 NAUSEA: Primary | ICD-10-CM

## 2020-08-05 DIAGNOSIS — R50.9 FEVER, UNSPECIFIED FEVER CAUSE: ICD-10-CM

## 2020-08-05 DIAGNOSIS — R19.7 DIARRHEA, UNSPECIFIED TYPE: ICD-10-CM

## 2020-08-05 PROCEDURE — 99214 PR OFFICE/OUTPT VISIT, EST, LEVL IV, 30-39 MIN: ICD-10-PCS | Mod: 95,,, | Performed by: NURSE PRACTITIONER

## 2020-08-05 PROCEDURE — 99214 OFFICE O/P EST MOD 30 MIN: CPT | Mod: 95,,, | Performed by: NURSE PRACTITIONER

## 2020-08-05 RX ORDER — ONDANSETRON HYDROCHLORIDE 8 MG/1
8 TABLET, FILM COATED ORAL EVERY 8 HOURS PRN
Qty: 30 TABLET | Refills: 0 | Status: SHIPPED | OUTPATIENT
Start: 2020-08-05

## 2020-08-05 NOTE — LETTER
[address]. ? [place], [address] ? Phone [phone] ? Fax [phone]           Return to Work/School    Patient: Tyrell Bradford  YOB: 1985   Date: 08/05/2020      To Whom It May Concern:     Tyrell Bradford was in contact with me in my office via video visit on 08/05/2020. COVID-19 is present in our communities across the state. Not all patients are eligible or appropriate to be tested. In this situation, your employee meets the following criteria:     Tyrell Bradford has met the criteria for COVID-19 testing based upon symptoms, travel, and/or potential exposure. The test has been completed and is pending results at this time. During this time the employee is not able to work and should be quarantined per the Centers for Disease Control timelines.      If you have any questions or concerns, or if I can be of further assistance, please do not hesitate to contact me.     Sincerely,    Kiesha Rockwell NP

## 2020-08-05 NOTE — PROGRESS NOTES
Subjective:    The patient location is: [place].  The chief complaint leading to consultation is: stomach issues    Visit type: audiovisual    Face to Face time with patient: 15  20 minutes of total time spent on the encounter, which includes face to face time and non-face to face time preparing to see the patient (eg, review of tests), Obtaining and/or reviewing separately obtained history, Documenting clinical information in the electronic or other health record, Independently interpreting results (not separately reported) and communicating results to the patient/family/caregiver, or Care coordination (not separately reported).         Each patient to whom he or she provides medical services by telemedicine is:  (1) informed of the relationship between the physician and patient and the respective role of any other health care provider with respect to management of the patient; and (2) notified that he or she may decline to receive medical services by telemedicine and may withdraw from such care at any time.    Notes:      Patient ID: Tyrell Bradfodr is a 34 y.o. male.    Chief Complaint: No chief complaint on file.    HPI   This is a 35 y/o male patient of dr. Castro who is new to me and presents for stomach issues that started 4 days ago of watery diarrhea, nausea, fever.  Patient reports that 3-4 people at his work last week had same symptoms.  Patient denies chills shortness of breath chest pain vomiting migraine like headaches loss of sense of taste or smell.      Review of Systems   Respiratory: Negative for chest tightness.    Cardiovascular: Negative for chest pain and palpitations.   Gastrointestinal: Positive for diarrhea. Negative for vomiting.   Endocrine: Positive for polydipsia.   Musculoskeletal: Positive for myalgias.   Neurological: Negative for weakness and headaches.           Objective:   There were no vitals filed for this visit.       Physical Exam  Constitutional:       Appearance: He is  well-developed.   HENT:      Head: Normocephalic.      Right Ear: External ear normal.      Left Ear: External ear normal.   Eyes:      Conjunctiva/sclera: Conjunctivae normal.   Neck:      Musculoskeletal: Normal range of motion and neck supple.      Thyroid: No thyromegaly.      Vascular: No JVD.      Trachea: No tracheal deviation.   Cardiovascular:      Comments: No c/o cp or s/s of distress noted  Pulmonary:      Effort: Pulmonary effort is normal. No respiratory distress.   Musculoskeletal: Normal range of motion.   Skin:     General: Skin is dry.   Neurological:      Mental Status: He is alert and oriented to person, place, and time.   Psychiatric:         Behavior: Behavior normal.         Thought Content: Thought content normal.         Judgment: Judgment normal.         Assessment:       1. Nausea    2. Diarrhea, unspecified type    3. Fever, unspecified fever cause        Plan:     stay hydrated with water and gatorade, light soft bland diet,   Take zofran for nausea prn  COVID  Testing ordered  Pt instructed to assume he is positive until test resulted and quarantine at home, pt understands  Will send a work note via portal

## 2020-08-06 ENCOUNTER — LAB VISIT (OUTPATIENT)
Dept: FAMILY MEDICINE | Facility: CLINIC | Age: 35
End: 2020-08-06
Payer: COMMERCIAL

## 2020-08-06 DIAGNOSIS — R19.7 DIARRHEA, UNSPECIFIED TYPE: ICD-10-CM

## 2020-08-06 DIAGNOSIS — R50.9 FEVER, UNSPECIFIED FEVER CAUSE: ICD-10-CM

## 2020-08-06 DIAGNOSIS — R11.0 NAUSEA: ICD-10-CM

## 2020-08-06 PROCEDURE — U0003 INFECTIOUS AGENT DETECTION BY NUCLEIC ACID (DNA OR RNA); SEVERE ACUTE RESPIRATORY SYNDROME CORONAVIRUS 2 (SARS-COV-2) (CORONAVIRUS DISEASE [COVID-19]), AMPLIFIED PROBE TECHNIQUE, MAKING USE OF HIGH THROUGHPUT TECHNOLOGIES AS DESCRIBED BY CMS-2020-01-R: HCPCS

## 2020-08-07 ENCOUNTER — PATIENT MESSAGE (OUTPATIENT)
Dept: INTERNAL MEDICINE | Facility: CLINIC | Age: 35
End: 2020-08-07

## 2020-08-07 LAB — SARS-COV-2 RNA RESP QL NAA+PROBE: NOT DETECTED

## 2020-08-14 DIAGNOSIS — Z11.59 NEED FOR HEPATITIS C SCREENING TEST: ICD-10-CM

## 2021-04-16 ENCOUNTER — PATIENT MESSAGE (OUTPATIENT)
Dept: RESEARCH | Facility: HOSPITAL | Age: 36
End: 2021-04-16

## 2021-10-04 ENCOUNTER — PATIENT MESSAGE (OUTPATIENT)
Dept: ADMINISTRATIVE | Facility: HOSPITAL | Age: 36
End: 2021-10-04